# Patient Record
Sex: MALE | Race: BLACK OR AFRICAN AMERICAN | Employment: OTHER | URBAN - METROPOLITAN AREA
[De-identification: names, ages, dates, MRNs, and addresses within clinical notes are randomized per-mention and may not be internally consistent; named-entity substitution may affect disease eponyms.]

---

## 2019-09-03 ENCOUNTER — OFFICE VISIT (OUTPATIENT)
Dept: WOUND CARE | Facility: HOSPITAL | Age: 69
End: 2019-09-03
Attending: NURSE PRACTITIONER
Payer: MEDICARE

## 2019-09-03 ENCOUNTER — HOSPITAL ENCOUNTER (OUTPATIENT)
Dept: GENERAL RADIOLOGY | Facility: HOSPITAL | Age: 69
Discharge: HOME | End: 2019-09-03
Attending: NURSE PRACTITIONER
Payer: MEDICARE

## 2019-09-03 ENCOUNTER — LAB ENCOUNTER (OUTPATIENT)
Dept: LAB | Facility: HOSPITAL | Age: 69
End: 2019-09-03
Attending: NURSE PRACTITIONER
Payer: MEDICARE

## 2019-09-03 DIAGNOSIS — L89.610 PRESSURE INJURY OF RIGHT HEEL, UNSTAGEABLE (HCC): Primary | ICD-10-CM

## 2019-09-03 DIAGNOSIS — M24.561 CONTRACTURE OF KNEE, RIGHT: ICD-10-CM

## 2019-09-03 DIAGNOSIS — L89.100 UNSTAGEABLE PRESSURE ULCER OF BACK (HCC): Primary | ICD-10-CM

## 2019-09-03 DIAGNOSIS — L89.610: ICD-10-CM

## 2019-09-03 LAB
ALBUMIN SERPL-MCNC: 3.6 G/DL (ref 3.4–5)
ALBUMIN/GLOB SERPL: 0.8 {RATIO} (ref 1–2)
ALP LIVER SERPL-CCNC: 81 U/L (ref 45–117)
ALT SERPL-CCNC: 23 U/L (ref 16–61)
ANION GAP SERPL CALC-SCNC: 6 MMOL/L (ref 0–18)
AST SERPL-CCNC: 17 U/L (ref 15–37)
BASOPHILS # BLD AUTO: 0.02 X10(3) UL (ref 0–0.2)
BASOPHILS NFR BLD AUTO: 0.3 %
BILIRUB SERPL-MCNC: 0.6 MG/DL (ref 0.1–2)
BUN BLD-MCNC: 23 MG/DL (ref 7–18)
BUN/CREAT SERPL: 19 (ref 10–20)
CALCIUM BLD-MCNC: 9.5 MG/DL (ref 8.5–10.1)
CHLORIDE SERPL-SCNC: 109 MMOL/L (ref 98–112)
CO2 SERPL-SCNC: 26 MMOL/L (ref 21–32)
CREAT BLD-MCNC: 1.21 MG/DL (ref 0.7–1.3)
CRP SERPL-MCNC: 1.18 MG/DL (ref ?–0.3)
DEPRECATED RDW RBC AUTO: 51.8 FL (ref 35.1–46.3)
EOSINOPHIL # BLD AUTO: 0.08 X10(3) UL (ref 0–0.7)
EOSINOPHIL NFR BLD AUTO: 1 %
ERYTHROCYTE [DISTWIDTH] IN BLOOD BY AUTOMATED COUNT: 16.1 % (ref 11–15)
GLOBULIN PLAS-MCNC: 4.5 G/DL (ref 2.8–4.4)
GLUCOSE BLD-MCNC: 95 MG/DL (ref 70–99)
HCT VFR BLD AUTO: 32.5 % (ref 39–53)
HGB BLD-MCNC: 10.3 G/DL (ref 13–17.5)
IMM GRANULOCYTES # BLD AUTO: 0.02 X10(3) UL (ref 0–1)
IMM GRANULOCYTES NFR BLD: 0.3 %
LYMPHOCYTES # BLD AUTO: 2.13 X10(3) UL (ref 1–4)
LYMPHOCYTES NFR BLD AUTO: 27.1 %
M PROTEIN MFR SERPL ELPH: 8.1 G/DL (ref 6.4–8.2)
MCH RBC QN AUTO: 27.7 PG (ref 26–34)
MCHC RBC AUTO-ENTMCNC: 31.7 G/DL (ref 31–37)
MCV RBC AUTO: 87.4 FL (ref 80–100)
MONOCYTES # BLD AUTO: 0.73 X10(3) UL (ref 0.1–1)
MONOCYTES NFR BLD AUTO: 9.3 %
NEUTROPHILS # BLD AUTO: 4.88 X10 (3) UL (ref 1.5–7.7)
NEUTROPHILS # BLD AUTO: 4.88 X10(3) UL (ref 1.5–7.7)
NEUTROPHILS NFR BLD AUTO: 62 %
OSMOLALITY SERPL CALC.SUM OF ELEC: 295 MOSM/KG (ref 275–295)
PLATELET # BLD AUTO: 256 10(3)UL (ref 150–450)
POTASSIUM SERPL-SCNC: 4.3 MMOL/L (ref 3.5–5.1)
PREALB SERPL-MCNC: 22.7 MG/DL (ref 20–40)
RBC # BLD AUTO: 3.72 X10(6)UL (ref 3.8–5.8)
SED RATE-ML: 35 MM/HR (ref 0–12)
SODIUM SERPL-SCNC: 141 MMOL/L (ref 136–145)
WBC # BLD AUTO: 7.9 X10(3) UL (ref 4–11)

## 2019-09-03 PROCEDURE — 73650 X-RAY EXAM OF HEEL: CPT | Performed by: NURSE PRACTITIONER

## 2019-09-03 PROCEDURE — 97597 DBRDMT OPN WND 1ST 20 CM/<: CPT

## 2019-09-03 PROCEDURE — 84134 ASSAY OF PREALBUMIN: CPT

## 2019-09-03 PROCEDURE — 85025 COMPLETE CBC W/AUTO DIFF WBC: CPT

## 2019-09-03 PROCEDURE — 80053 COMPREHEN METABOLIC PANEL: CPT

## 2019-09-03 PROCEDURE — 86140 C-REACTIVE PROTEIN: CPT

## 2019-09-03 PROCEDURE — 99204 OFFICE O/P NEW MOD 45 MIN: CPT

## 2019-09-03 PROCEDURE — 36415 COLL VENOUS BLD VENIPUNCTURE: CPT

## 2019-09-03 PROCEDURE — 85652 RBC SED RATE AUTOMATED: CPT

## 2019-09-03 NOTE — PROGRESS NOTES
Subjective    Chief Complaint  This information was obtained from the patient  Patient is here for an initial exam for a right heel wound.  The wife has been applying Betadine daily, he has a nurse that visits from [facility] who suggested the patient be the wound will be a stage 4 and may have osteo once we assess. per spouse she is not exactly sure when he developed the wound but she feels it was probably in january when he was between the hospitals and rehabs.     General Notes:  Labs, x-ray, and arteri Constitutional Symptoms (General Health):  Fatigue, Fever, Loss of Appetite, Marked Weight Change, Night Sweats, Chills  Respiratory: Cough, Shortness of Breath  Cardiovascular (Central/Peripheral): Chest Pain, Dyspnea on Exertion  Gastrointestinal (GI): transfers/mobility. Integumentary (Hair, Skin)  see wound documentation. Wound #1 Right Heel is a chronic Unstageable Pressure Injury Obscured full-thickness skin and tissue loss Pressure Ulcer and has received a status of Not Healed.  Initial wound level of 0 throughout and a pain level of 0 following the procedure.  Post Debridement Measurements: 3.5cm length x 3.9cm width x 0.2cm depth; with an area of 13.65 sq cm and a volume of 2.73 cubic cm; post debridement Stage noted as Unstageable Pressure In date and value: - sept 2019  Osteomyelitis suspected due to: - length of time, depth, necrosis  xray sept 2019  labs 2019    Ancillary Services:    - resillience: please send out a msw to assist the family with finding long term caregivers ( with his care for his heel. all questions/concerns addressed.     Electronic Signature(s)  Signed By: Date:  Jann SMALL, SITA-AP, CFCN, CSWS, [address] [place] [address],[address] [facility] 09/03/2019 16:49:19       Entered By: Jann Rodgers on 09/03/2019 16:46:17

## 2019-09-13 ENCOUNTER — HOSPITAL ENCOUNTER (OUTPATIENT)
Dept: CARDIOLOGY CLINIC | Facility: HOSPITAL | Age: 69
Discharge: HOME | End: 2019-09-13
Attending: NURSE PRACTITIONER
Payer: MEDICARE

## 2019-09-13 DIAGNOSIS — I73.9 PERIPHERAL VASCULAR DISEASE (HCC): ICD-10-CM

## 2019-09-13 DIAGNOSIS — L89.610 PRESSURE ULCER OF RIGHT HEEL, UNSTAGEABLE (HCC): ICD-10-CM

## 2019-09-13 PROCEDURE — 93926 LOWER EXTREMITY STUDY: CPT | Performed by: NURSE PRACTITIONER

## 2019-09-17 ENCOUNTER — OFFICE VISIT (OUTPATIENT)
Dept: WOUND CARE | Facility: HOSPITAL | Age: 69
End: 2019-09-17
Attending: NURSE PRACTITIONER
Payer: MEDICARE

## 2019-09-17 DIAGNOSIS — I73.9 PERIPHERAL VASCULAR DISEASE (HCC): ICD-10-CM

## 2019-09-17 DIAGNOSIS — L89.610 PRESSURE ULCER OF RIGHT HEEL, UNSTAGEABLE (HCC): Primary | ICD-10-CM

## 2019-09-17 DIAGNOSIS — L89.610 PRESSURE INJURY OF RIGHT HEEL, UNSTAGEABLE (HCC): ICD-10-CM

## 2019-09-17 PROCEDURE — 99213 OFFICE O/P EST LOW 20 MIN: CPT

## 2019-09-17 NOTE — PROGRESS NOTES
Subjective    Chief Complaint  This information was obtained from the patient  Patient is here for a wound care follow up. His spouse denies any new wound concerns.     Allergies  morphine HCl (Severity: Moderate, Reaction: confusion and agitation)    HPI 9-17-19 patient returns with spouse, patient continues with severe pain, his xray was negative for osteo, patient had arterial studies done which did show arterial inflow issues. the heel wound is stable.  we discussed that they should f/u with vascular-I w bp wnl for patient. Pulse Regular and wnl for patient. Rebekah Riedel Respirations easy and unlabored. Temperature wnl. Weight normal for height. . Appearance neat and clean. Appears in no acute distress. Well nourished and well developed.  Height/Length: 76 in (193.04 cm Additional Information  BP (mmHg):: 200/100 manual        Assessment    Active Problems    ICD-10  (Encounter Diagnosis) L89.610 - Pressure ulcer of right heel, unstageable  (Encounter Diagnosis) I70.234 - Atherosclerosis of native arteries of right leg wi Perfusion assessed by JENNIFER/TBI - RIGHT BRACHIAL: 243  RIGHT POSTERIOR TIBIAL: N/v  RIGHT DORSALIS PEDIS: N/v  RIGHT ANKLE/BRACHIAL INDEX: Not calculated  RIGHT TOE PRESSURE: 73  RIGHT TOE BRACHIAL INDEX: 0.3    CONCLUSION:     1.  Technically limited study s

## 2019-10-08 ENCOUNTER — OFFICE VISIT (OUTPATIENT)
Dept: WOUND CARE | Facility: HOSPITAL | Age: 69
End: 2019-10-08
Attending: NURSE PRACTITIONER
Payer: MEDICARE

## 2019-10-08 DIAGNOSIS — L89.610 PRESSURE ULCER OF RIGHT HEEL, UNSTAGEABLE (HCC): Primary | ICD-10-CM

## 2019-10-08 DIAGNOSIS — I73.9 PERIPHERAL VASCULAR DISEASE (HCC): ICD-10-CM

## 2019-10-08 PROCEDURE — 97597 DBRDMT OPN WND 1ST 20 CM/<: CPT

## 2019-10-08 NOTE — PROGRESS NOTES
Subjective    Chief Complaint  This information was obtained from the patient  Patient is here for a wound care follow up. His spouse feels that there is an odor.     Allergies  morphine HCl (Severity: Moderate, Reaction: confusion and agitation)    HPI  Th 9-17-19 patient returns with spouse, patient continues with severe pain, his xray was negative for osteo, patient had arterial studies done which did show arterial inflow issues. the heel wound is stable.   we discussed that they should f/u with vascular-I Cardiovascular (Central/Peripheral): Chest Pain, Dyspnea on Exertion  Gastrointestinal (GI): Change in Bowel Habits, Loss of Appetite  Integumentary (Hair/Skin/Nails): Calluses/Corns  Psychiatric: Claustrophobia, Depression    Additional Information  Medic The periwound skin moisture is normal. The periwound skin exhibited: Edema. The periwound skin did not exhibit: Callus, Crepitus, Fluctuance, Erythema. The temperature of the periwound skin is Cool.  Periwound skin does not exhibit signs or symptoms of infe Other: - 3-4 weeks    Misc/Additional Orders  Home health care nurse for wound care. - resillience  Supplement with a daily multivitamin.   Increase dietary protein - look for Dat by [facility] labs (These are essential branch chain amino acids that help with Perfusion assessed by doppler. - no signal in right distal hallux or dp, monophasic anterior tib at anterior ankle and pt  Perfusion assessed by palpation of pulses. - right dp not palpable, pt weekly palpable.  left dp/pt palpable  Last sharp debridement d

## 2019-11-05 ENCOUNTER — APPOINTMENT (OUTPATIENT)
Dept: WOUND CARE | Facility: HOSPITAL | Age: 69
End: 2019-11-05
Attending: NURSE PRACTITIONER
Payer: MEDICARE

## 2019-11-11 RX ORDER — AMLODIPINE BESYLATE AND BENAZEPRIL HYDROCHLORIDE 10; 40 MG/1; MG/1
1 CAPSULE ORAL DAILY
COMMUNITY

## 2019-11-11 RX ORDER — ASCORBIC ACID 500 MG
500 TABLET ORAL DAILY
COMMUNITY

## 2019-11-11 RX ORDER — METOCLOPRAMIDE 10 MG/1
10 TABLET ORAL ONCE
Status: DISCONTINUED | OUTPATIENT
Start: 2019-11-11 | End: 2019-11-11

## 2019-11-12 ENCOUNTER — ANESTHESIA EVENT (OUTPATIENT)
Dept: SURGERY | Facility: HOSPITAL | Age: 69
DRG: 240 | End: 2019-11-12
Payer: MEDICARE

## 2019-11-13 ENCOUNTER — HOSPITAL ENCOUNTER (INPATIENT)
Facility: HOSPITAL | Age: 69
LOS: 6 days | Discharge: NURSING, REHAB OR LONG-TERM CARE | DRG: 240 | End: 2019-11-19
Attending: SURGERY | Admitting: INTERNAL MEDICINE
Payer: MEDICARE

## 2019-11-13 ENCOUNTER — ANESTHESIA (OUTPATIENT)
Dept: SURGERY | Facility: HOSPITAL | Age: 69
DRG: 240 | End: 2019-11-13
Payer: MEDICARE

## 2019-11-13 DIAGNOSIS — Z01.818 PREOP TESTING: Primary | ICD-10-CM

## 2019-11-13 PROCEDURE — 88307 TISSUE EXAM BY PATHOLOGIST: CPT | Performed by: SURGERY

## 2019-11-13 PROCEDURE — 86901 BLOOD TYPING SEROLOGIC RH(D): CPT | Performed by: SURGERY

## 2019-11-13 PROCEDURE — 36415 COLL VENOUS BLD VENIPUNCTURE: CPT | Performed by: SURGERY

## 2019-11-13 PROCEDURE — 87641 MR-STAPH DNA AMP PROBE: CPT | Performed by: SURGERY

## 2019-11-13 PROCEDURE — 86900 BLOOD TYPING SEROLOGIC ABO: CPT | Performed by: SURGERY

## 2019-11-13 PROCEDURE — 86850 RBC ANTIBODY SCREEN: CPT | Performed by: SURGERY

## 2019-11-13 PROCEDURE — 88311 DECALCIFY TISSUE: CPT | Performed by: SURGERY

## 2019-11-13 PROCEDURE — 86920 COMPATIBILITY TEST SPIN: CPT

## 2019-11-13 PROCEDURE — 93005 ELECTROCARDIOGRAM TRACING: CPT

## 2019-11-13 PROCEDURE — 0Y6C0Z3 DETACHMENT AT RIGHT UPPER LEG, LOW, OPEN APPROACH: ICD-10-PCS | Performed by: SURGERY

## 2019-11-13 PROCEDURE — 93010 ELECTROCARDIOGRAM REPORT: CPT | Performed by: SURGERY

## 2019-11-13 RX ORDER — PHENYLEPHRINE HCL 10 MG/ML
VIAL (ML) INJECTION AS NEEDED
Status: DISCONTINUED | OUTPATIENT
Start: 2019-11-13 | End: 2019-11-13 | Stop reason: SURG

## 2019-11-13 RX ORDER — BISACODYL 10 MG
10 SUPPOSITORY, RECTAL RECTAL
Status: DISCONTINUED | OUTPATIENT
Start: 2019-11-13 | End: 2019-11-19

## 2019-11-13 RX ORDER — SODIUM CHLORIDE, SODIUM LACTATE, POTASSIUM CHLORIDE, CALCIUM CHLORIDE 600; 310; 30; 20 MG/100ML; MG/100ML; MG/100ML; MG/100ML
INJECTION, SOLUTION INTRAVENOUS CONTINUOUS
Status: DISCONTINUED | OUTPATIENT
Start: 2019-11-13 | End: 2019-11-13 | Stop reason: HOSPADM

## 2019-11-13 RX ORDER — METOPROLOL TARTRATE 5 MG/5ML
2.5 INJECTION INTRAVENOUS ONCE
Status: DISCONTINUED | OUTPATIENT
Start: 2019-11-13 | End: 2019-11-13 | Stop reason: HOSPADM

## 2019-11-13 RX ORDER — HYDROCODONE BITARTRATE AND ACETAMINOPHEN 5; 325 MG/1; MG/1
2 TABLET ORAL EVERY 4 HOURS PRN
Status: DISCONTINUED | OUTPATIENT
Start: 2019-11-13 | End: 2019-11-19

## 2019-11-13 RX ORDER — DEXAMETHASONE SODIUM PHOSPHATE 4 MG/ML
VIAL (ML) INJECTION AS NEEDED
Status: DISCONTINUED | OUTPATIENT
Start: 2019-11-13 | End: 2019-11-13 | Stop reason: SURG

## 2019-11-13 RX ORDER — DOCUSATE SODIUM 100 MG/1
100 CAPSULE, LIQUID FILLED ORAL 2 TIMES DAILY
Status: DISCONTINUED | OUTPATIENT
Start: 2019-11-13 | End: 2019-11-19

## 2019-11-13 RX ORDER — NALOXONE HYDROCHLORIDE 0.4 MG/ML
80 INJECTION, SOLUTION INTRAMUSCULAR; INTRAVENOUS; SUBCUTANEOUS AS NEEDED
Status: DISCONTINUED | OUTPATIENT
Start: 2019-11-13 | End: 2019-11-13 | Stop reason: HOSPADM

## 2019-11-13 RX ORDER — HYDROCODONE BITARTRATE AND ACETAMINOPHEN 5; 325 MG/1; MG/1
1 TABLET ORAL EVERY 4 HOURS PRN
Status: DISCONTINUED | OUTPATIENT
Start: 2019-11-13 | End: 2019-11-19

## 2019-11-13 RX ORDER — SODIUM PHOSPHATE, DIBASIC AND SODIUM PHOSPHATE, MONOBASIC 7; 19 G/133ML; G/133ML
1 ENEMA RECTAL ONCE AS NEEDED
Status: DISCONTINUED | OUTPATIENT
Start: 2019-11-13 | End: 2019-11-19

## 2019-11-13 RX ORDER — QUETIAPINE 25 MG/1
50 TABLET, FILM COATED ORAL NIGHTLY
Status: DISCONTINUED | OUTPATIENT
Start: 2019-11-13 | End: 2019-11-19

## 2019-11-13 RX ORDER — ONDANSETRON 2 MG/ML
INJECTION INTRAMUSCULAR; INTRAVENOUS AS NEEDED
Status: DISCONTINUED | OUTPATIENT
Start: 2019-11-13 | End: 2019-11-13 | Stop reason: SURG

## 2019-11-13 RX ORDER — FINASTERIDE 5 MG/1
5 TABLET, FILM COATED ORAL DAILY
Status: DISCONTINUED | OUTPATIENT
Start: 2019-11-13 | End: 2019-11-19

## 2019-11-13 RX ORDER — POLYETHYLENE GLYCOL 3350 17 G/17G
17 POWDER, FOR SOLUTION ORAL DAILY PRN
Status: DISCONTINUED | OUTPATIENT
Start: 2019-11-13 | End: 2019-11-19

## 2019-11-13 RX ORDER — DEXTROSE AND SODIUM CHLORIDE 5; .45 G/100ML; G/100ML
INJECTION, SOLUTION INTRAVENOUS CONTINUOUS
Status: DISCONTINUED | OUTPATIENT
Start: 2019-11-13 | End: 2019-11-14

## 2019-11-13 RX ORDER — CEFAZOLIN SODIUM/WATER 2 G/20 ML
2 SYRINGE (ML) INTRAVENOUS ONCE
Status: COMPLETED | OUTPATIENT
Start: 2019-11-13 | End: 2019-11-13

## 2019-11-13 RX ORDER — HALOPERIDOL DECANOATE 50 MG/ML
25 INJECTION INTRAMUSCULAR 4 TIMES DAILY PRN
Status: DISCONTINUED | OUTPATIENT
Start: 2019-11-13 | End: 2019-11-13

## 2019-11-13 RX ORDER — SODIUM CHLORIDE, SODIUM LACTATE, POTASSIUM CHLORIDE, CALCIUM CHLORIDE 600; 310; 30; 20 MG/100ML; MG/100ML; MG/100ML; MG/100ML
INJECTION, SOLUTION INTRAVENOUS CONTINUOUS
Status: DISCONTINUED | OUTPATIENT
Start: 2019-11-13 | End: 2019-11-14

## 2019-11-13 RX ORDER — CEFAZOLIN SODIUM/WATER 2 G/20 ML
2 SYRINGE (ML) INTRAVENOUS EVERY 8 HOURS
Status: COMPLETED | OUTPATIENT
Start: 2019-11-13 | End: 2019-11-14

## 2019-11-13 RX ORDER — HYDRALAZINE HYDROCHLORIDE 20 MG/ML
10 INJECTION INTRAMUSCULAR; INTRAVENOUS ONCE
Status: COMPLETED | OUTPATIENT
Start: 2019-11-13 | End: 2019-11-13

## 2019-11-13 RX ORDER — ACETAMINOPHEN 325 MG/1
650 TABLET ORAL EVERY 4 HOURS PRN
Status: DISCONTINUED | OUTPATIENT
Start: 2019-11-13 | End: 2019-11-19

## 2019-11-13 RX ORDER — ATORVASTATIN CALCIUM 40 MG/1
40 TABLET, FILM COATED ORAL NIGHTLY
Status: DISCONTINUED | OUTPATIENT
Start: 2019-11-13 | End: 2019-11-19

## 2019-11-13 RX ORDER — HALOPERIDOL 5 MG/ML
1 INJECTION INTRAMUSCULAR EVERY 6 HOURS PRN
Status: DISCONTINUED | OUTPATIENT
Start: 2019-11-13 | End: 2019-11-19

## 2019-11-13 RX ORDER — LIDOCAINE HYDROCHLORIDE 10 MG/ML
INJECTION, SOLUTION EPIDURAL; INFILTRATION; INTRACAUDAL; PERINEURAL AS NEEDED
Status: DISCONTINUED | OUTPATIENT
Start: 2019-11-13 | End: 2019-11-13 | Stop reason: SURG

## 2019-11-13 RX ORDER — HYDROMORPHONE HYDROCHLORIDE 1 MG/ML
0.2 INJECTION, SOLUTION INTRAMUSCULAR; INTRAVENOUS; SUBCUTANEOUS EVERY 2 HOUR PRN
Status: DISCONTINUED | OUTPATIENT
Start: 2019-11-13 | End: 2019-11-19

## 2019-11-13 RX ORDER — PROCHLORPERAZINE EDISYLATE 5 MG/ML
5 INJECTION INTRAMUSCULAR; INTRAVENOUS ONCE AS NEEDED
Status: DISCONTINUED | OUTPATIENT
Start: 2019-11-13 | End: 2019-11-13 | Stop reason: HOSPADM

## 2019-11-13 RX ORDER — ENOXAPARIN SODIUM 100 MG/ML
40 INJECTION SUBCUTANEOUS DAILY
Status: DISCONTINUED | OUTPATIENT
Start: 2019-11-13 | End: 2019-11-19

## 2019-11-13 RX ORDER — ONDANSETRON 2 MG/ML
4 INJECTION INTRAMUSCULAR; INTRAVENOUS EVERY 6 HOURS PRN
Status: DISCONTINUED | OUTPATIENT
Start: 2019-11-13 | End: 2019-11-19

## 2019-11-13 RX ORDER — ONDANSETRON 2 MG/ML
4 INJECTION INTRAMUSCULAR; INTRAVENOUS ONCE AS NEEDED
Status: DISCONTINUED | OUTPATIENT
Start: 2019-11-13 | End: 2019-11-13 | Stop reason: HOSPADM

## 2019-11-13 RX ORDER — HALOPERIDOL 5 MG/ML
0.25 INJECTION INTRAMUSCULAR ONCE AS NEEDED
Status: COMPLETED | OUTPATIENT
Start: 2019-11-13 | End: 2019-11-13

## 2019-11-13 RX ORDER — BUPIVACAINE HYDROCHLORIDE AND EPINEPHRINE 5; 5 MG/ML; UG/ML
INJECTION, SOLUTION PERINEURAL AS NEEDED
Status: DISCONTINUED | OUTPATIENT
Start: 2019-11-13 | End: 2019-11-13 | Stop reason: HOSPADM

## 2019-11-13 RX ADMIN — PHENYLEPHRINE HCL 100 MCG: 10 MG/ML VIAL (ML) INJECTION at 09:39:00

## 2019-11-13 RX ADMIN — PHENYLEPHRINE HCL 100 MCG: 10 MG/ML VIAL (ML) INJECTION at 10:28:00

## 2019-11-13 RX ADMIN — PHENYLEPHRINE HCL 100 MCG: 10 MG/ML VIAL (ML) INJECTION at 10:23:00

## 2019-11-13 RX ADMIN — CEFAZOLIN SODIUM/WATER 2 G: 2 G/20 ML SYRINGE (ML) INTRAVENOUS at 09:40:00

## 2019-11-13 RX ADMIN — SODIUM CHLORIDE, SODIUM LACTATE, POTASSIUM CHLORIDE, CALCIUM CHLORIDE: 600; 310; 30; 20 INJECTION, SOLUTION INTRAVENOUS at 09:22:00

## 2019-11-13 RX ADMIN — DEXAMETHASONE SODIUM PHOSPHATE 4 MG: 4 MG/ML VIAL (ML) INJECTION at 09:31:00

## 2019-11-13 RX ADMIN — LIDOCAINE HYDROCHLORIDE 50 MG: 10 INJECTION, SOLUTION EPIDURAL; INFILTRATION; INTRACAUDAL; PERINEURAL at 09:27:00

## 2019-11-13 RX ADMIN — SODIUM CHLORIDE, SODIUM LACTATE, POTASSIUM CHLORIDE, CALCIUM CHLORIDE: 600; 310; 30; 20 INJECTION, SOLUTION INTRAVENOUS at 10:54:00

## 2019-11-13 RX ADMIN — ONDANSETRON 4 MG: 2 INJECTION INTRAMUSCULAR; INTRAVENOUS at 10:11:00

## 2019-11-13 RX ADMIN — PHENYLEPHRINE HCL 100 MCG: 10 MG/ML VIAL (ML) INJECTION at 09:54:00

## 2019-11-13 NOTE — H&P
[facility] Hospitalist H&P       CC: No chief complaint on file. PCP: No primary care provider on file.     History of Present Illness: 79 y/o m w hx of htn, dementia p/w R aka for arterial occlusive disease w rle ulceration w bp high s/p hydralazine 10 mg 11  hydrALAzine HCl 50 MG Oral Tab, Take 50 mg by mouth as needed. FOR sbp > 150 , Disp: , Rfl:   HYDROcodone-acetaminophen  MG Oral Tab, TK 1 T PO QID PRN, Disp: , Rfl: 0  Metoprolol Succinate  MG Oral Tablet 24 Hr, Take 100 mg by mouth daily. normal. No rashes or lesions. Neurologic: Normal strength, no focal deficit appreciated     Diagnostic Data:    CBC/Chem  No results for input(s): WBC, HGB, MCV, PLT, BAND, INR in the last 168 hours.     Invalid input(s): LYM#, MONO#, BASOS#, EOSIN#    N

## 2019-11-13 NOTE — OPERATIVE REPORT
[facility]     PATIENTS NAME: Narendra Lopez  ATTENDING PHYSICIAN: Shahrzad Jeffers MD  OPERATING PHYSICIAN: Beth Oropeza MD  CSN: [id number]     LOCATION:  OR  MRN: [id number]    YOB: 1950  ADMISSION DATE: 11/13/2019  OPERATION RANDALL patient will never walk due to his severe hip contracture. The skin incision was then performed and deepened through the subcutaneous tissue until the muscular fascia was identified.   The muscle groups over the anterior and medial thighs were divided with

## 2019-11-13 NOTE — ANESTHESIA PROCEDURE NOTES
Airway  Urgency: Elective      General Information and Staff    Patient location during procedure: OR  Anesthesiologist: Yohan Vila MD  Performed: anesthesiologist     Indications and Patient Condition  Indications for airway management: anesthesia  Se

## 2019-11-13 NOTE — H&P
Penny Baez MD.   [facility]  Vascular and Endovascular Surgery  [address] [facility]                    VASCULAR SURGERY   HP NOTE           Name: Cahyo Chandler   :   1950  [id number]      REFERRING PHYSICIAN:  Constantino Self Referred  PRIMA Ciprofloxacin HCl 250 MG Oral Tab, TK 1 T PO Q 12 H FOR 5 DAYS, Disp: , Rfl: 0  •  cloNIDine 0.3 MG/24HR Transdermal Patch Weekly, , Disp: , Rfl:   •  Dakins, 1/4 strength, 0.125 % External Solution, APPLY SOLUTION TO AFFECTED AREA BID FOR WOUND, Disp: , R it.  Both femoral pulses are palpable but are weak.   Both feet are warm and well-perfused with a week bilateral dorsalis pedis signals.     IMAGING:         ASSESSMENT  Diagnoses and all orders for this visit:     Atherosclerosis of artery of extremity wit

## 2019-11-13 NOTE — ANESTHESIA POSTPROCEDURE EVALUATION
Patient: Greg Ramirez    Procedure Summary     Date:  11/13/19 Room / Location:  [address] [place] [address] MAIN OR 17 / [address] [place] [address] MAIN OR    Anesthesia Start:  7960 Anesthesia Stop:  6563    Procedure:  KNEE AMPUTATION ABOVE/BELOW (Right ) Diagnosis:  (athrosclerosis of artery of e

## 2019-11-13 NOTE — ANESTHESIA PREPROCEDURE EVALUATION
Anesthesia PreOp Note    HPI:     Bethany Hughes is a 78year old male who presents for preoperative consultation requested by: Brenna Fernando MD    Date of Surgery: 11/13/2019    Procedure(s):  KNEE AMPUTATION ABOVE/BELOW  Indication: athrosclerosis of 1  ergocalciferol 35450 units Oral Cap, 50,000 Units once a week. Q WEDNESDAY , Disp: , Rfl:   finasteride 5 MG Oral Tab, TK 1 T PO QD, Disp: , Rfl: 11  hydrALAzine HCl 50 MG Oral Tab, Take 50 mg by mouth as needed.  FOR sbp > 150 , Disp: , Rfl:   HYDROcodo status: Former Smoker        Packs/day: 0.00      Smokeless tobacco: Never Used    Substance and Sexual Activity      Alcohol use: Not Currently      Drug use: Never      Sexual activity: Not on file    Lifestyle      Physical activity:        Days per wee exam  (+) hypertension,     Neuro/Psych    (+)  CVA, psychiatric history (Dementia),       GI/Hepatic/Renal    (+) chronic renal disease,     Endo/Other    Abdominal                Anesthesia Plan:   ASA:  3  Plan:   General  Airway:  LMA  Informed Consent

## 2019-11-14 PROCEDURE — 85027 COMPLETE CBC AUTOMATED: CPT | Performed by: SURGERY

## 2019-11-14 PROCEDURE — 80048 BASIC METABOLIC PNL TOTAL CA: CPT | Performed by: SURGERY

## 2019-11-14 RX ORDER — FAMOTIDINE 20 MG/1
20 TABLET ORAL ONCE
Status: DISCONTINUED | OUTPATIENT
Start: 2019-11-14 | End: 2019-11-19

## 2019-11-14 RX ORDER — SODIUM CHLORIDE 9 MG/ML
INJECTION, SOLUTION INTRAVENOUS CONTINUOUS
Status: DISCONTINUED | OUTPATIENT
Start: 2019-11-14 | End: 2019-11-15

## 2019-11-14 RX ORDER — ACETAMINOPHEN 500 MG
1000 TABLET ORAL ONCE
Status: DISCONTINUED | OUTPATIENT
Start: 2019-11-14 | End: 2019-11-19

## 2019-11-14 NOTE — CM/SW NOTE
11/18 353pm: [facility] are unable to accept. Waiting on an accepting facility.    -----------------  11/18 1028am: updated information with PT/OT recommendations have been sent via [employer]. Waiting on accepting facility.    -

## 2019-11-14 NOTE — PROGRESS NOTES
[facility] Hospitalist Progress Note     CC: Hospital Follow up    PCP: No primary care provider on file.        Assessment/Plan:     Active Problems:    Preop testing    79 y/o m w hx of htn, dementia p/w R aka for arterial occlusive disease w rle ulceration    CREATSERUM 1.42*   GFRAA 57*   GFRNAA 50*   CA 8.4*      K 3.8      CO2 26.0       No results for input(s): ALT, AST, ALB, AMYLASE, LIPASE, LDH in the last 168 hours.     Invalid input(s): ALPHOS, TBIL, DBIL, TPROT      Imaging:          Meds:

## 2019-11-14 NOTE — PLAN OF CARE
Problem: Patient Centered Care  Goal: Patient preferences are identified and integrated in the patient's plan of care  Description  Interventions:  - What would you like us to know as we care for you?   - Provide timely, complete, and accurate informatio guidelines  Outcome: Progressing     Problem: SAFETY ADULT - FALL  Goal: Free from fall injury  Description  INTERVENTIONS:  - Assess pt frequently for physical needs  - Identify cognitive and physical deficits and behaviors that affect risk of falls.   - I emptying  Outcome: Progressing     Problem: SKIN/TISSUE INTEGRITY - ADULT  Goal: Skin integrity remains intact  Description  INTERVENTIONS  - Assess and document risk factors for pressure ulcer development  - Assess and document skin integrity  - Monitor f

## 2019-11-14 NOTE — PROGRESS NOTES
Received pt from PACU- VSS, alert to self, poor historian, wife gave PMH. right leg wrapped with ACE bandage, IVF infusing , IV ancef given, tele in place, haldol IM ordered for agitation and restlessness.  Pt screams out in pain, continues to pull off tele

## 2019-11-15 ENCOUNTER — APPOINTMENT (OUTPATIENT)
Dept: GENERAL RADIOLOGY | Facility: HOSPITAL | Age: 69
DRG: 240 | End: 2019-11-15
Attending: HOSPITALIST
Payer: MEDICARE

## 2019-11-15 PROCEDURE — 85014 HEMATOCRIT: CPT | Performed by: HOSPITALIST

## 2019-11-15 PROCEDURE — 30233R1 TRANSFUSION OF NONAUTOLOGOUS PLATELETS INTO PERIPHERAL VEIN, PERCUTANEOUS APPROACH: ICD-10-PCS | Performed by: INTERNAL MEDICINE

## 2019-11-15 PROCEDURE — 71046 X-RAY EXAM CHEST 2 VIEWS: CPT | Performed by: HOSPITALIST

## 2019-11-15 PROCEDURE — 80048 BASIC METABOLIC PNL TOTAL CA: CPT | Performed by: HOSPITALIST

## 2019-11-15 PROCEDURE — 85060 BLOOD SMEAR INTERPRETATION: CPT | Performed by: HOSPITALIST

## 2019-11-15 PROCEDURE — 85027 COMPLETE CBC AUTOMATED: CPT | Performed by: HOSPITALIST

## 2019-11-15 PROCEDURE — 87040 BLOOD CULTURE FOR BACTERIA: CPT | Performed by: HOSPITALIST

## 2019-11-15 PROCEDURE — 85018 HEMOGLOBIN: CPT | Performed by: HOSPITALIST

## 2019-11-15 PROCEDURE — 36430 TRANSFUSION BLD/BLD COMPNT: CPT

## 2019-11-15 RX ORDER — SODIUM CHLORIDE 9 MG/ML
INJECTION, SOLUTION INTRAVENOUS ONCE
Status: COMPLETED | OUTPATIENT
Start: 2019-11-15 | End: 2019-11-15

## 2019-11-15 NOTE — PROGRESS NOTES
Patient alert and oriented to self. Confused, history of vascular dementia. Continues with IVFs. Patient calls out throughout the day. Is receiving Norco for pain control. Tolerating diet, is a feeder.  Repositioned frequently as patient constantly turn lop

## 2019-11-15 NOTE — PLAN OF CARE
Problem: Patient Centered Care  Goal: Patient preferences are identified and integrated in the patient's plan of care  Description  Interventions:  - What would you like us to know as we care for you?   - Provide timely, complete, and accurate informatio neutropenic period  Description  INTERVENTIONS  - Monitor WBC  - Administer growth factors as ordered  - Implement neutropenic guidelines  Outcome: Progressing     Problem: SAFETY ADULT - FALL  Goal: Free from fall injury  Description  INTERVENTIONS:  - As care  - Consider collaborating with pharmacy to review patient's medication profile  - Implement strategies to promote bladder emptying  Outcome: Progressing     Problem: SKIN/TISSUE INTEGRITY - ADULT  Goal: Skin integrity remains intact  Description  INTE

## 2019-11-15 NOTE — PROGRESS NOTES
[facility] Hospitalist Progress Note     CC: Hospital Follow up    PCP: No primary care provider on file.        Assessment/Plan:     Active Problems:    Preop testing    77 y/o m w hx of htn, dementia p/w R aka for arterial occlusive disease w rle ulceration    Recent Labs   Lab 11/14/19  0453   *   BUN 30*   CREATSERUM 1.42*   GFRAA 57*   GFRNAA 50*   CA 8.4*      K 3.8      CO2 26.0       No results for input(s): ALT, AST, ALB, AMYLASE, LIPASE, LDH in the last 168 hours.     Invalid in

## 2019-11-15 NOTE — PROGRESS NOTES
[facility] HOSP - [facility]     Vascular Surgery Progress Note    Shelia Kwok Patient Status:  Inpatient    1950 MRN [id number]   Location [facility] 4W/SW/SE Attending Steve Gasca MD   Hosp Day # 2 PCP No primary care provider on mukund per tab 1 tablet, 1 tablet, Oral, Q4H PRN    Or  HYDROcodone-acetaminophen (NORCO) 5-325 MG per tab 2 tablet, 2 tablet, Oral, Q4H PRN  HYDROmorphone HCl (DILAUDID) 1 MG/ML injection 0.2 mg, 0.2 mg, Intravenous, Q2H PRN  atorvastatin (LIPITOR) tab 40 mg, 40

## 2019-11-16 PROCEDURE — 80048 BASIC METABOLIC PNL TOTAL CA: CPT | Performed by: HOSPITALIST

## 2019-11-16 PROCEDURE — 85027 COMPLETE CBC AUTOMATED: CPT | Performed by: HOSPITALIST

## 2019-11-16 PROCEDURE — 97167 OT EVAL HIGH COMPLEX 60 MIN: CPT

## 2019-11-16 PROCEDURE — 97530 THERAPEUTIC ACTIVITIES: CPT

## 2019-11-16 PROCEDURE — 97162 PT EVAL MOD COMPLEX 30 MIN: CPT

## 2019-11-16 PROCEDURE — 81001 URINALYSIS AUTO W/SCOPE: CPT | Performed by: HOSPITALIST

## 2019-11-16 PROCEDURE — 87086 URINE CULTURE/COLONY COUNT: CPT | Performed by: HOSPITALIST

## 2019-11-16 RX ORDER — METOPROLOL SUCCINATE 100 MG/1
100 TABLET, EXTENDED RELEASE ORAL DAILY
Status: DISCONTINUED | OUTPATIENT
Start: 2019-11-17 | End: 2019-11-19

## 2019-11-16 NOTE — PLAN OF CARE
Patient has history of dementia is oriented only to self. Patient yells out and c/o pain in right leg. Dilaudid and Norco given PRN.  VSS. Bed alarm on and in lowest position.    0315: patient yelled out in pain and upon entering the room surgical dressing Manage/alleviate anxiety  - Utilize distraction and/or relaxation techniques  - Monitor for opioid side effects  - Notify MD/LIP if interventions unsuccessful or patient reports new pain  - Anticipate increased pain with activity and pre-medicate as approp or complex needs related to functional status, cognitive ability or social support system  Outcome: Progressing     Problem: GENITOURINARY - ADULT  Goal: Absence of urinary retention  Description  INTERVENTIONS:  - Assess patient’s ability to void and empt

## 2019-11-16 NOTE — OCCUPATIONAL THERAPY NOTE
OCCUPATIONAL THERAPY EVALUATION - INPATIENT      Room Number: 469/469-A  Evaluation Date: 11/16/2019  Type of Evaluation: Initial       Physician Order: IP Consult to Occupational Therapy  Reason for Therapy: ADL/IADL Dysfunction and Discharge Planning after anesthesia   • BPH (benign prostatic hyperplasia)    • Cataract    • Dementia ([facility] [address]     Sundowners per wife   • Fall    • Heel ulcer ([facility] [address]     right   • High blood pressure    • High cholesterol    • Incontinence    • Muscle weakness     right arm an another person does the patient currently need…  -   Putting on and taking off regular lower body clothing?: Total  -   Bathing (including washing, rinsing, drying)?: A Lot  -   Toileting, which includes using toilet, bedpan or urinal? : A Lot  -   Putting

## 2019-11-16 NOTE — PHYSICAL THERAPY NOTE
PHYSICAL THERAPY EVALUATION - INPATIENT     Room Number: 469/469-A  Evaluation Date: 11/16/2019  Type of Evaluation: Initial   Physician Order: PT Eval and Treat    Presenting Problem: right AKA 11/13  Reason for Therapy: Mobility Dysfunction and Discharg History  Past Medical History:   Diagnosis Date   • Anesthesia complication     became more confused after anesthesia   • BPH (benign prostatic hyperplasia)    • Cataract    • Dementia ([facility] [address]     Sundowners per wife   • Fall    • Heel ulcer ([facility] [address]     right NEUROLOGICAL FINDINGS                      ACTIVITY TOLERANCE                         O2 WALK                  AM-PAC '6-Clicks' INPATIENT SHORT FORM - BASIC MOBILITY  How much difficulty does the patient currently have. ..  -   Turning ov

## 2019-11-16 NOTE — PROGRESS NOTES
[facility] Hospitalist Progress Note     CC: Hospital Follow up    PCP: No primary care provider on file.        Assessment/Plan:     Active Problems:    Preop testing    Mr. Isaiah Craig is a 77 y/o m w hx of htn, dementia p/w R aka for arterial occlusive disease w rle RBC 2.77* 2.39*  --  2.63*   HGB 7.7* 6.9* 7.6* 7.5*   HCT 24.3* 20.9* 23.2* 22.7*   MCV 87.7 87.4  --  86.3   MCH 27.8 28.9  --  28.5   MCHC 31.7 33.0  --  33.0   RDW 15.5* 15.1*  --  14.9   WBC 10.9 10.5  --  9.6   .0 175.0  --  182.0         Re

## 2019-11-16 NOTE — PROGRESS NOTES
Patient alert to self. Vascular dementia is his baseline. Continues to yell out for random things and complaints of pain. Patient in need of a urine sample for urine cx.  Incontinent and unable to use a urinal. Spoke with wife Enedelia Helton and she was ok for kimber

## 2019-11-16 NOTE — PROGRESS NOTES
[facility] HOSP - [facility]     Vascular Surgery Progress Note    Kymberly Rangel Patient Status:  Inpatient    1950 MRN [id number]   Location [facility] 4W/SW/SE Attending Abad Sesay MD   Hosp Day # 3 PCP No primary care provider on mukund PRN  HYDROmorphone HCl (DILAUDID) 1 MG/ML injection 0.2 mg, 0.2 mg, Intravenous, Q2H PRN  atorvastatin (LIPITOR) tab 40 mg, 40 mg, Oral, Nightly  finasteride (PROSCAR) tab 5 mg, 5 mg, Oral, Daily  QUEtiapine Fumarate (SEROQUEL) tab 50 mg, 50 mg, Oral, Nigh

## 2019-11-17 PROCEDURE — 84145 PROCALCITONIN (PCT): CPT | Performed by: INTERNAL MEDICINE

## 2019-11-17 PROCEDURE — 80048 BASIC METABOLIC PNL TOTAL CA: CPT | Performed by: HOSPITALIST

## 2019-11-17 PROCEDURE — 85027 COMPLETE CBC AUTOMATED: CPT | Performed by: HOSPITALIST

## 2019-11-17 NOTE — PLAN OF CARE
Patient has history of dementia is oriented only to self. Patient yells out and c/o pain in right leg. Norco given PRN.  VSS. Bed alarm on and in lowest position. R AKA remains open to air.    Problem: Patient Centered Care  Goal: Patient preferences are id MD/LIP if interventions unsuccessful or patient reports new pain  - Anticipate increased pain with activity and pre-medicate as appropriate  Outcome: Progressing     Problem: RISK FOR INFECTION - ADULT  Goal: Absence of fever/infection during anticipated n Problem: GENITOURINARY - ADULT  Goal: Absence of urinary retention  Description  INTERVENTIONS:  - Assess patient’s ability to void and empty bladder  - Monitor intake/output and perform bladder scan as needed  - Follow urinary retention protocol/standar

## 2019-11-17 NOTE — PROGRESS NOTES
[facility] Hospitalist Progress Note     CC: Hospital Follow up    PCP: No primary care provider on file.        Assessment/Plan:     Active Problems:    Preop testing    Mr. Munoz Heading is a 79 y/o m w hx of htn, dementia p/w R aka for arterial occlusive disease w rle 2.39*  --  2.63* 2.73*   HGB 6.9* 7.6* 7.5* 7.7*   HCT 20.9* 23.2* 22.7* 24.6*   MCV 87.4  --  86.3 90.1   MCH 28.9  --  28.5 28.2   MCHC 33.0  --  33.0 31.3   RDW 15.1*  --  14.9 15.0   WBC 10.5  --  9.6 7.6   .0  --  182.0 228.0         Recent Lab

## 2019-11-17 NOTE — PLAN OF CARE
Problem: Patient Centered Care  Goal: Patient preferences are identified and integrated in the patient's plan of care  Description  Interventions:  - What would you like us to know as we care for you?   - Provide timely, complete, and accurate informatio INFECTION - ADULT  Goal: Absence of fever/infection during anticipated neutropenic period  Description  INTERVENTIONS  - Monitor WBC  - Administer growth factors as ordered  - Implement neutropenic guidelines  Outcome: Progressing     Problem: SAFETY ADULT bladder scan as needed  - Follow urinary retention protocol/standard of care  - Consider collaborating with pharmacy to review patient's medication profile  - Implement strategies to promote bladder emptying  Outcome: Progressing     Problem: SKIN/TISSUE I

## 2019-11-18 PROCEDURE — 83735 ASSAY OF MAGNESIUM: CPT | Performed by: INTERNAL MEDICINE

## 2019-11-18 PROCEDURE — 85027 COMPLETE CBC AUTOMATED: CPT | Performed by: INTERNAL MEDICINE

## 2019-11-18 PROCEDURE — 80048 BASIC METABOLIC PNL TOTAL CA: CPT | Performed by: INTERNAL MEDICINE

## 2019-11-18 RX ORDER — AMLODIPINE BESYLATE 10 MG/1
10 TABLET ORAL DAILY
Status: DISCONTINUED | OUTPATIENT
Start: 2019-11-18 | End: 2019-11-18

## 2019-11-18 RX ORDER — CLONIDINE 0.3 MG/24H
1 PATCH, EXTENDED RELEASE TRANSDERMAL WEEKLY
Status: DISCONTINUED | OUTPATIENT
Start: 2019-11-18 | End: 2019-11-19

## 2019-11-18 RX ORDER — HYDRALAZINE HYDROCHLORIDE 50 MG/1
50 TABLET, FILM COATED ORAL EVERY 8 HOURS PRN
Status: DISCONTINUED | OUTPATIENT
Start: 2019-11-18 | End: 2019-11-19

## 2019-11-18 RX ORDER — LISINOPRIL 40 MG/1
40 TABLET ORAL ONCE
Status: COMPLETED | OUTPATIENT
Start: 2019-11-18 | End: 2019-11-18

## 2019-11-18 RX ORDER — AMLODIPINE BESYLATE AND BENAZEPRIL HYDROCHLORIDE 10; 40 MG/1; MG/1
1 CAPSULE ORAL DAILY
Status: DISCONTINUED | OUTPATIENT
Start: 2019-11-18 | End: 2019-11-18

## 2019-11-18 NOTE — PROGRESS NOTES
[facility] Hospitalist Progress Note     CC: Hospital Follow up    PCP: No primary care provider on file.        Assessment/Plan:     Active Problems:    Preop testing    Mr. Darius John is a 77 y/o m w hx of htn, dementia p/w R aka for arterial occlusive disease w rle 11/18/19  0512   RBC 2.63* 2.73* 2.94*   HGB 7.5* 7.7* 8.1*   HCT 22.7* 24.6* 25.3*   MCV 86.3 90.1 86.1   MCH 28.5 28.2 27.6   MCHC 33.0 31.3 32.0   RDW 14.9 15.0 14.5   WBC 9.6 7.6 9.4   .0 228.0 281.0         Recent Labs   Lab 11/16/19  0602 11/1

## 2019-11-18 NOTE — PLAN OF CARE
Pt pleasantly confused, laisha pain, incontinent of urine and stool, continues to get up without staff assistance, bed alarm in place, room close to nursing station, pt awaiting rehab placement.

## 2019-11-19 VITALS
DIASTOLIC BLOOD PRESSURE: 94 MMHG | BODY MASS INDEX: 24.36 KG/M2 | TEMPERATURE: 99 F | OXYGEN SATURATION: 100 % | HEART RATE: 74 BPM | SYSTOLIC BLOOD PRESSURE: 155 MMHG | HEIGHT: 76 IN | WEIGHT: 200 LBS | RESPIRATION RATE: 18 BRPM

## 2019-11-19 PROCEDURE — 82962 GLUCOSE BLOOD TEST: CPT

## 2019-11-19 PROCEDURE — 3E023GC INTRODUCTION OF OTHER THERAPEUTIC SUBSTANCE INTO MUSCLE, PERCUTANEOUS APPROACH: ICD-10-PCS | Performed by: INTERNAL MEDICINE

## 2019-11-19 PROCEDURE — 90471 IMMUNIZATION ADMIN: CPT

## 2019-11-19 RX ORDER — HYDROCODONE BITARTRATE AND ACETAMINOPHEN 10; 325 MG/1; MG/1
1 TABLET ORAL EVERY 4 HOURS PRN
Qty: 30 TABLET | Refills: 0 | Status: SHIPPED | OUTPATIENT
Start: 2019-11-19

## 2019-11-19 NOTE — DISCHARGE SUMMARY
[facility] Hospitalist Discharge Summary   Patient ID:  Narendra John [de-identified]  78year old 1/27/1950    Admit date: 11/13/2019  Discharge date: 11/19/2019  Risk of Readmission Lace+ Score: 32  59-90 High Risk  29-58 Medium Risk  0-28   Low Risk    Primary Car DRISDOL/VITAMIN D2     finasteride 5 MG Tabs  Commonly known as:  PROSCAR     hydrALAzine HCl 50 MG Tabs  Commonly known as:  APRESOLINE     LOTREL 10-40 MG Caps  Generic drug:  amLODIPine Besy-Benazepril HCl     Metoprolol Succinate  MG Tb24  Common

## 2019-11-19 NOTE — CM/SW NOTE
Pt's wife, Adam Sorto requested [facility]. SW spoke w/ Rosalva Grady from [address] [place] [address] who stated they will be ready for pt at 36pm.     SW spoke w/ Adalberto from [facility] and arranged ambulance transport to [address] [place] [address] at 530pm. [facility] notified of discharge.

## 2019-11-19 NOTE — CM/SW NOTE
341pm:  [facility]. SW attempted to call wife, Philbert Canavan and left a voicemail about the 2 accepting facilities: [facility] and [address] [facility].  SW awaiting to hear back from wife w/ SNF choice; plan for [facility]

## 2019-11-19 NOTE — PLAN OF CARE
Patient Alert to self, pleasantly confused. PRN norco given for pain. Incontinent of bowel and bladder. Poor safety awareness, bed alarm in place and frequent rounding performed. Patient awaiting placement for rehab.      Problem: Patient/Family Goals  Goal needs  - Identify cognitive and physical deficits and behaviors that affect risk of falls.   - [place] fall precautions as indicated by assessment.  - Educate pt/family on patient safety including physical limitations  - Instruct pt to call for assistance risk factors for pressure ulcer development  - Assess and document skin integrity  - Monitor for areas of redness and/or skin breakdown  - Initiate interventions, skin care algorithm/standards of care as needed  Outcome: Progressing  Goal: Incision(s), wou

## 2019-12-02 PROBLEM — E78.5 HYPERLIPIDEMIA, UNSPECIFIED: Status: ACTIVE | Noted: 2018-02-19

## 2019-12-02 PROBLEM — M54.40 LUMBAGO WITH SCIATICA, UNSPECIFIED SIDE: Status: ACTIVE | Noted: 2018-02-19

## 2019-12-02 PROBLEM — N18.30 CHRONIC KIDNEY DISEASE, STAGE 3 (MODERATE): Status: ACTIVE | Noted: 2018-02-19

## 2019-12-02 PROBLEM — I10 ESSENTIAL (PRIMARY) HYPERTENSION: Status: ACTIVE | Noted: 2018-02-19

## 2019-12-02 PROBLEM — N28.89 RENAL MASS: Status: ACTIVE | Noted: 2019-12-02

## 2019-12-02 PROBLEM — I63.9 CEREBRAL INFARCTION, UNSPECIFIED (HCC): Status: ACTIVE | Noted: 2018-02-19

## (undated) DEVICE — INTENDED FOR TISSUE SEPARATION, AND OTHER PROCEDURES THAT REQUIRE A SHARP SURGICAL BLADE TO PUNCTURE OR CUT.: Brand: BARD-PARKER ® STAINLESS STEEL BLADES

## (undated) DEVICE — Device

## (undated) DEVICE — SUTURE PROLENE 3-0 8558H

## (undated) DEVICE — PROXIMATE RH ROTATING HEAD SKIN STAPLERS (35 WIDE) CONTAINS 35 STAINLESS STEEL STAPLES: Brand: PROXIMATE

## (undated) DEVICE — SUTURE SILK 2-0 SA85H

## (undated) DEVICE — SUPER SPONGES,MEDIUM: Brand: KERLIX

## (undated) DEVICE — SUCTION CANISTER, 3000CC,SAFELINER: Brand: DEROYAL

## (undated) DEVICE — PACK SRG UNV 1 STRL LF

## (undated) DEVICE — BANDAGE ROLL,100% COTTON, 6 PLY, LARGE: Brand: KERLIX

## (undated) DEVICE — TRAY SKIN PREP PVP-1

## (undated) DEVICE — SUTURE VICRYL 2-0 CT-1

## (undated) DEVICE — SUTURE SILK 3-0 SA64H

## (undated) DEVICE — SOL  .9 1000ML BTL

## (undated) DEVICE — ENCORE® PERRY STYLE 42 PF SIZE 8, STERILE LATEX POWDER-FREE SURGICAL GLOVE: Brand: ENCORE

## (undated) DEVICE — IMPERVIOUS STOCKINETTE: Brand: DEROYAL

## (undated) DEVICE — SUTURE SILK 0

## (undated) DEVICE — LOWER EXTREMITY: Brand: MEDLINE INDUSTRIES, INC.

## (undated) DEVICE — BATTERY

## (undated) DEVICE — BLADE SW 29MM 58MM THK.64MM LG

## (undated) NOTE — IP AVS SNAPSHOT
[facility]            (For Outpatient Use Only) Initial Admit Date: 11/13/2019   Inpt/Obs Admit Date: Inpt: 11/13/19 / Obs: N/A   Discharge Date:    Aylin Benito:  [de-identified]   MRN: [de-identified]   CSN: [id number]   CEID: [id number]        E Subscriber ID:  Pt Rel to Subscriber:    Hospital Account Financial Class: Medicare    November 19, 2019

## (undated) NOTE — IP AVS SNAPSHOT
Patient Demographics     Address  [address] [place] [address]  [place] [address] Phone  [phone] [facility])  [phone] (Mobile) *Preferred* E-mail Address  [email or web address]      Emergency Contact(s)     Name Relation Home Work Mobile    lizett ferguson Take 1 capsule by mouth daily. Metoprolol Succinate  MG Tb24  Commonly known as: Toprol XL  Next dose due: Tomorrow morning      Take 100 mg by mouth daily. MULTIVITAL Tabs  Next dose due:   Tomorrow morning      Take 1 tablet by [id number] amLODIPine Besylate (NORVASC) 10 mg, lisinopril 40 mg for Lotrel 10/40 ([facility] only) 11/19/19 1016 Given      [id number] atorvastatin (LIPITOR) tab 40 mg 11/18/19 2044 Given      [id number] docusate sodium (COLACE) cap 100 mg 11/18/19 2044 Given Specimen:  Urine, clean catch      Urine Culture No Growth at 18-24 hrs.     Emergency MRSA Screen by PCR STAT [[id number]]  (Normal) Collected:  11/13/19 0912    Order Status:  Completed Lab Status:  Final result Updated:  11/13/19 1041    Specimen:  Other Multiple Vitamins-Minerals (MULTIVITAL) Oral Tab, Take 1 tablet by mouth daily. , Disp: , Rfl:   mupirocin 2% Nasal Ointment, 1 Application by Each Nare route 2 (two) times daily.  Until MRSA results come back negarive, Disp: , Rfl:   atorvastatin 40 MG Oral Head:  Normocephalic, without obvious abnormality, atraumatic. Eyes:  Sclera anicteric, No conjunctival pallor, EOMs intact. Nose: Nares normal. Septum midline. Mucosa normal. No drainage.    Throat: Lips, mucosa, and tongue normal. Teeth and gums norm Patient and/or patient's family given opportunity to ask questions and note understanding and agreeing with therapeutic plan as outlined    Thank Sintia Porter MD    [facility] Hospitalist  Answering Service number: [phone][[email or web address]]      Electronically sign Patient and/or patient's family given opportunity to ask questions and note understanding and agreeing with therapeutic plan as outlined    Thank Sintia Porter MD    [facility] Hospitalist  Answering Service number: [phone][[email or web address]]      Electronically sign Operative Procedures: Procedure(s) (LRB):  KNEE AMPUTATION ABOVE/BELOW (Right)  Radiology:         Discharge Instructions     Medication List      CHANGE how you take these medications    HYDROcodone-acetaminophen  MG Tabs  Commonly known as:  Hipolito Pabon Total Time Coordinating Care: > than 30 minutes    Patient had opportunity to ask questions and state understand and agree with therapeutic plan as outlined    Marvin Corona MD  [facility] Hospitalist  Answering Service number: [phone][[email or web address]]      Electronicall